# Patient Record
Sex: MALE | Race: ASIAN
[De-identification: names, ages, dates, MRNs, and addresses within clinical notes are randomized per-mention and may not be internally consistent; named-entity substitution may affect disease eponyms.]

---

## 2017-05-18 ENCOUNTER — HOSPITAL ENCOUNTER (OUTPATIENT)
Dept: HOSPITAL 45 - C.RDSM | Age: 22
Discharge: HOME | End: 2017-05-18
Attending: FAMILY MEDICINE
Payer: COMMERCIAL

## 2017-05-18 DIAGNOSIS — M54.2: Primary | ICD-10-CM

## 2017-05-18 NOTE — DIAGNOSTIC IMAGING REPORT
CERVICAL SPINE 4 OR 5 VIEWS



CLINICAL HISTORY: Neck pain. History of prior trauma.    



COMPARISON STUDY:  No previous studies for comparison.



FINDINGS: The prevertebral soft tissues are normal. No fractures or subluxations

are visualized. There is a cervical dextroscoliosis.



IMPRESSION:  Mild dextroscoliosis. No fractures or subluxations are visualized. 









Electronically signed by:  Indio Guerrero M.D.

5/18/2017 11:01 AM



Dictated Date/Time:  5/18/2017 11:00 AM

## 2018-05-22 ENCOUNTER — HOSPITAL ENCOUNTER (INPATIENT)
Dept: HOSPITAL 45 - C.ACU | Age: 23
Discharge: HOME | DRG: 132 | End: 2018-05-22
Payer: COMMERCIAL

## 2018-05-22 VITALS
OXYGEN SATURATION: 97 % | DIASTOLIC BLOOD PRESSURE: 62 MMHG | TEMPERATURE: 97.16 F | SYSTOLIC BLOOD PRESSURE: 122 MMHG | HEART RATE: 70 BPM

## 2018-05-22 VITALS
BODY MASS INDEX: 25.65 KG/M2 | WEIGHT: 183.2 LBS | HEIGHT: 71 IN | WEIGHT: 183.2 LBS | HEIGHT: 71 IN | BODY MASS INDEX: 25.65 KG/M2

## 2018-05-22 VITALS
HEART RATE: 67 BPM | DIASTOLIC BLOOD PRESSURE: 78 MMHG | TEMPERATURE: 97.88 F | OXYGEN SATURATION: 96 % | SYSTOLIC BLOOD PRESSURE: 147 MMHG

## 2018-05-22 VITALS
TEMPERATURE: 98.42 F | OXYGEN SATURATION: 99 % | DIASTOLIC BLOOD PRESSURE: 88 MMHG | SYSTOLIC BLOOD PRESSURE: 140 MMHG | HEART RATE: 51 BPM

## 2018-05-22 VITALS
DIASTOLIC BLOOD PRESSURE: 62 MMHG | HEART RATE: 70 BPM | SYSTOLIC BLOOD PRESSURE: 122 MMHG | OXYGEN SATURATION: 97 % | TEMPERATURE: 97.16 F

## 2018-05-22 VITALS — OXYGEN SATURATION: 94 % | DIASTOLIC BLOOD PRESSURE: 79 MMHG | SYSTOLIC BLOOD PRESSURE: 143 MMHG | HEART RATE: 62 BPM

## 2018-05-22 VITALS — OXYGEN SATURATION: 95 % | DIASTOLIC BLOOD PRESSURE: 73 MMHG | HEART RATE: 62 BPM | SYSTOLIC BLOOD PRESSURE: 131 MMHG

## 2018-05-22 VITALS — SYSTOLIC BLOOD PRESSURE: 136 MMHG | OXYGEN SATURATION: 95 % | DIASTOLIC BLOOD PRESSURE: 69 MMHG | HEART RATE: 60 BPM

## 2018-05-22 DIAGNOSIS — M26.213: ICD-10-CM

## 2018-05-22 DIAGNOSIS — Z88.0: ICD-10-CM

## 2018-05-22 DIAGNOSIS — M26.623: ICD-10-CM

## 2018-05-22 DIAGNOSIS — M26.02: Primary | ICD-10-CM

## 2018-05-22 PROCEDURE — 0NSR04Z REPOSITION MAXILLA WITH INTERNAL FIXATION DEVICE, OPEN APPROACH: ICD-10-PCS

## 2018-05-22 NOTE — DISCHARGE INSTRUCTIONS
Discharge Instructions


Date of Service


May 22, 2018.





Admission


Reason for Admission:  Maxillary Hypoplasia





Discharge


Discharge Diagnosis / Problem:  Maxillary hypoplasia





Discharge Goals


Goal(s):  Improve function





Activity Recommendations


Activity Limitations:  as noted below


Lifting Limitations:  no more than 25 pounds


Exercise/Sports Limitations:  gradually increase as tolerated


Shower/Bathe:  no limitations


Driving or Machine Use:  resume 3 days after discharge





.





Instructions / Follow-Up


Instructions / Follow-Up


Very soft diet, rinse mouth gently with salt water 3-4 times a day.  Saline 

nasal spray as needed for nasal congestion.  Blow nose very gently.  OK to 

drive once off of prescription pain meds.  Take post op meds as prescribed (

start Z rufino this evening, ibuprofen for pain, hydrocodone for increased pain - 

prescriptions already filled).





Current Hospital Diet


Patient's current hospital diet: Clear Liquid Diet





Discharge Diet


Recommended Diet:  Full Liquid Diet





Procedures


Procedures Performed:  


Maxillary Lefort I Osteotomy





Pending Studies


Studies pending at discharge:  no





Medical Emergencies








.


Who to Call and When:





Medical Emergencies:  If at any time you feel your situation is an emergency, 

please call 911 immediately.





.





Non-Emergent Contact


Non-Emergency issues call your:  Surgeon


Call Non-Emergent contact if:  temperature is above 101, your pain is not 

controlled, wound has increased drainage, wound has increased redness, wound 

has increased pain


Contact Dr. Bennett at the office [phone] or directly at [phone]





Follow up appointment as scheduled.


.








"Provider Documentation" section prepared by Ron Bennett.








.





[place] Drug Monitoring Program


Search Results:  no issues identified

## 2018-05-22 NOTE — ANESTHESIOLOGY PROGRESS NOTE
Anesthesia Post Op Note


Date & Time


May 22, 2018 at 10:13





Vital Signs


Pain Intensity:  0





Vital Signs Past 12 Hours








  Date Time  Temp Pulse Resp B/P (MAP) Pulse Ox O2 Delivery O2 Flow Rate FiO2


 


5/22/18 10:00  60 12 143/69 93 Room Air  


 


5/22/18 09:50  60 12 146/73 98 Oxymask 10 


 


5/22/18 09:40  58 12 146/74 96 Oxymask 10 


 


5/22/18 09:31 36 60 16 152/75 98 Oxymask 10 


 


5/22/18 06:13 36.9 51 18 140/88 (105) 99 Room Air  











Notes


Mental Status:  alert / awake / arousable, participated in evaluation


Pt Amnestic to Procedure:  Yes


Nausea / Vomiting:  adequately controlled


Pain:  adequately controlled


Airway Patency, RR, SpO2:  stable & adequate


BP & HR:  stable & adequate


Hydration State:  stable & adequate


Anesthetic Complications:  no major complications apparent

## 2018-05-22 NOTE — [facility] OPERATIVE REPORT
Operative Report


Operative Date


May 22, 2018.





Pre-Operative Diagnosis





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


Temporomandibular


Joint Pain





Post-Operative Diagnosis





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 


Joint Pain





Procedure(s) Performed





Maxillary Lefort I Osteotomy





Surgeon


Dr. Bennett





Assistant Surgeon(s)


SPENCER Baugh





Estimated Blood Loss


150 ml





Findings


Good occlusion, stable fixation plates, no IMF required





Fluids


1200





Specimens





none per surgeon





Drains


None





Anesthesia Type


General





Complication(s)


none





Disposition


yes


Recovery Room / PACU





Indications


Handicapping malocclusion due to a developmental deformity in the maxilla 

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





Description of Procedure


After intubation and a sterile prep and drape the oral cavity was suctioned.  

Local anesthesia administered.  The typical upper buccal sulcus incision was 

made and the maxilla exposed.  A LeFort I osteotomy was completed and 

downfractured.  The maxilla was advanced and plated securly in its new 

position.  Closure was completed with 3-0 vicryl in the adal-nasal musculature 

and 4-0 gut in the mucosa.


I attest to the content of the Intraoperative Record and any orders documented 

therein.  Any exceptions are noted below.

## 2018-05-22 NOTE — OPERATIVE REPORT
DATE OF OPERATION:  05/22/2018

 

PREOPERATIVE DIAGNOSIS:  Maxillary hypoplasia with resulting malocclusion.

 

POSTOPERATIVE DIAGNOSIS:  Same.

 

PROCEDURE:  LeFort I osteotomy for advancement with rigid internal fixation.

 

SURGEON:  Ron Bennett DDS

 

ASSISTANT:  Adriana Rowley CNP

 

ANESTHESIA:  General nasal endotracheal intubation.

 

ESTIMATED BLOOD LOSS:  150 mL.

 

DRAINS:  None.

 

SPECIMENS:  None.

 

COMPLICATIONS:  None.

 

INDICATIONS:  Javier is a 22-year-old man I know well who has a

developmental deformity of his maxilla resulting in a class 3 skeletal and

dental malocclusion.  This is a severe malocclusion resulting in difficulty

eating and an excessive force on the structures of the temporomandibular

joints.  He has had a full skeletal workup as well as orthodontic management

and is now ready to have definitive correction with LeFort I maxillary

advancement.  I have reviewed with him and his mother the indicated

procedures, the alternatives, the risks and benefits and potential

complications, and he has signed an informed consent.  He has also had a

routine preanesthesia evaluation.  He is felt to be a good candidate for the

surgery.

 

DESCRIPTION OF PROCEDURE:  Patient was taken to the operating room and placed

supine on the operating room table.  Routine anesthesia monitors were

applied.  Anesthesia was induced and endotracheal intubation was performed. 

The eyes were lubed and taped.  The endotracheal tube was secured including a

stitch at the columella and a sterile prep and drape was performed.  A

timeout was taken and then the oral cavity was entered.  I irrigated it with

chlorhexidine solution and used four 1.8 mL carpules of 0.5% Marcaine with

1:200,000 epinephrine as bilateral posterior superior alveolar nerve blocks

and bilateral infraorbital nerve blocks.  The typical upper buccal sulcus

incision was then made with the needle tip electrocautery and the anterior

maxilla was degloved.  I placed 4 IMF screws between the premolars, one in

each quadrant.  We used a reciprocating saw to complete the [place] I

osteotomy protecting the nasal mucosa.  I then used curved osteotome to

separate the pterygoid plates from the maxilla, a straight osteotome to

separate the nasal septum from the crest of the maxilla, and a guarded

osteotome to complete the osteotomies in the lateral nasal wall.  The maxilla

was then easily downfractured and areas of interfering bone were removed with

the rongeurs.  We then advanced the maxilla approximately 6 mm into the

prefabricated surgical stent and used the IMF screws to wire him into that

class 1 occlusion.  With the maxilla stably affixed to the mandible in the

right position, the mandible was rotated into a closed position ensuring that

the condyle stayed seated in the fossa and ensured that there was good bone

contact across the maxilla and then used four 2.0 Synthes plates and screws

to fix the maxilla in its new position.  The IMF was released and the

patient's occlusion was verified.  We then irrigated the wounds.  I used a

3-0 Vicryl as piriform cinch suture, about a 10 mm V-Y advancement in the

anterior labial mucosa, and then closed the upper buccal sulcus incision with

4-0 gut.  The patient was suctioned free.  Stomach was emptied with an

orogastric tube and patient was turned over to anesthesia, extubated in the

operating room, and transferred to the recovery area in stable condition.  At

the end of the procedure, all counts were correct.

 

 

I attest to the content of the Intraoperative Record and any orders documented therein. Any exception
s are noted below.